# Patient Record
Sex: MALE | Race: WHITE | NOT HISPANIC OR LATINO | Employment: OTHER | URBAN - METROPOLITAN AREA
[De-identification: names, ages, dates, MRNs, and addresses within clinical notes are randomized per-mention and may not be internally consistent; named-entity substitution may affect disease eponyms.]

---

## 2022-12-23 ENCOUNTER — HOSPITAL ENCOUNTER (EMERGENCY)
Facility: MEDICAL CENTER | Age: 82
End: 2022-12-24
Attending: EMERGENCY MEDICINE
Payer: COMMERCIAL

## 2022-12-23 VITALS
DIASTOLIC BLOOD PRESSURE: 73 MMHG | OXYGEN SATURATION: 98 % | SYSTOLIC BLOOD PRESSURE: 131 MMHG | BODY MASS INDEX: 25.48 KG/M2 | HEART RATE: 84 BPM | WEIGHT: 172 LBS | RESPIRATION RATE: 22 BRPM | HEIGHT: 69 IN | TEMPERATURE: 97.5 F

## 2022-12-23 DIAGNOSIS — K59.09 CHRONIC CONSTIPATION: ICD-10-CM

## 2022-12-23 DIAGNOSIS — R33.8 ACUTE URINARY RETENTION: ICD-10-CM

## 2022-12-23 PROCEDURE — 51798 US URINE CAPACITY MEASURE: CPT

## 2022-12-23 PROCEDURE — 303105 HCHG CATHETER EXTRA

## 2022-12-23 PROCEDURE — 51702 INSERT TEMP BLADDER CATH: CPT

## 2022-12-23 PROCEDURE — 99284 EMERGENCY DEPT VISIT MOD MDM: CPT

## 2022-12-23 ASSESSMENT — PAIN DESCRIPTION - PAIN TYPE: TYPE: ACUTE PAIN

## 2022-12-24 NOTE — ED TRIAGE NOTES
"82 yr old male brought in by EMS.    Chief Complaint   Patient presents with    Urinary Retention    Unable to Urinate     Pt came in by EMS for bladder pain and bladder retention.  [facility], they took out yesterday at 1500. Pt has been unable to urinate since.     BP (!) 184/95   Pulse 82   Temp 36.1 °C (97 °F) (Temporal)   Resp (!) 22   Ht 1.753 m (5' 9\")   Wt 78 kg (172 lb)   SpO2 99%   BMI 25.40 kg/m²     "

## 2022-12-24 NOTE — ED PROVIDER NOTES
ED Provider Note     Scribed for Annette Cohen D.O. by Amie Adamson. 12/23/2022, 10:32 PM.     Primary care provider: No primary care provider noted.  Means of arrival: EMS         History obtained from: Patient  History limited by: None    CHIEF COMPLAINT  Chief Complaint   Patient presents with    Urinary Retention    Unable to Urinate     Pt came in by EMS for bladder pain and bladder retention.  Pt [facility], they took out yesterday at 1500. Pt has been unable to urinate since.       HPI  Jose Luis Tripp is a 82 y.o. male who presents to the emergency Department for evaluation of an inability to urine onset today around 3 pm. The patient states that he had a catheter [facility] about a week ago by the [facility] due to an inability to urinate, but that it was incorrectly placed and caused irritation to his penis. Thus, he states that the catheter was removed yesterday. He endorses associated symptoms of bladder pain, irritation and bleeding of the penis, and constipation. He notes that the pain was originally alleviated by the placement of a Villa catheter last week. The patient states that he is on Tamsulosin and Golytely. The patient denies history of prostate surgery. No major medical history noted.     REVIEW OF SYSTEMS  Pertinent positives include bladder pain, irritation and bleeding of the penis, and constipation.  See HPI for further details. All other systems are negative.    PAST MEDICAL HISTORY  Benign prostatic hypertrophy  Urinary retention    FAMILY HISTORY  History reviewed. No pertinent family history.    SOCIAL HISTORY  Social History     Tobacco Use    Smoking status: Never    Smokeless tobacco: Never   Substance Use Topics    Alcohol use: Not Currently    Drug use: Never      Social History     Substance and Sexual Activity   Drug Use Never       SURGICAL HISTORY  History reviewed. No pertinent surgical history.    CURRENT MEDICATIONS  No current facility-administered medications  "for this encounter.    ALLERGIES  Allergies   Allergen Reactions    Gabapentin      Severe nausea       PHYSICAL EXAM  VITAL SIGNS: BP (!) 184/95   Pulse 82   Temp 36.1 °C (97 °F) (Temporal)   Resp (!) 22   Ht 1.753 m (5' 9\")   Wt 78 kg (172 lb)   SpO2 99%   BMI 25.40 kg/m²     Constitutional: Extremely uncomfortable. Patient is well developed, well nourished.   HENT: Normocephalic,  Oropharynx moist without erythema or exudates.  Eyes: PERRL, EOMI  Neck: Supple   Lymphatic: No lymphadenopathy noted.   Cardiovascular: Normal heart rate and Regular rhythm. No murmur  Thorax & Lungs: Clear and equal breath sounds with good excursion. No respiratory distress, no rhonchi, wheezing or rales.   Abdomen: Palpably distended bladder. Lightly pink colored urine with small clots. Bowel sounds normal in all four quadrants. Soft, no rebound , guarding, palpable masses.   Skin: Warm, Dry  Extremities: Peripheral pulses 4/4 No edema, No tenderness  Musculoskeletal: Normal range of motion in all major joints.   Neurologic: Alert & oriented x 3, Normal motor function, Normal sensory function, No lateralizing or focal deficits noted. DTR's 4/4 bilaterally.  Psychiatric: Affect normal, Judgment normal, Mood normal.     COURSE & MEDICAL DECISION MAKING  Pertinent Labs & Imaging studies reviewed. (See chart for details)  Bladder scanner shows almost 900 cc of urine.    10:55 PM - Patient seen and evaluated at bedside. I ordered for a catheter to be placed to remove the urine from the patient's bladder.  Patient is draining pink urine with small clots present.  He feels remarkably better after the catheter was placed and has no suprapubic discomfort any longer.  Patient does complain of excoriation and pain around the distal penile urethra.  There is no active bleeding noted at this time.  I discussed the plan for care with the patient, including use of Neosporin at home to reduce penis irritation. I also discussed the importance " of eating a high fiber diet. Patient verbalizes understanding and agreement to this plan of care. Differential diagnoses include, but are not limited to: urinary retention, constipation.    The patient will return for new or worsening symptoms and is stable at the time of discharge.  [facility] for further evaluation of his urinary retention and enlarged prostate.  In the meantime he is to make sure he is drinking plenty of fluids.    DISPOSITION:  Patient will be discharged home in stable condition.    FOLLOW UP:   Department of [facility] ([facility] [place] [address]  [phone]  Schedule an appointment as soon as possible for a visit in 1 week      FINAL IMPRESSION  1. Acute urinary retention    2. Chronic constipation         Amie KHAN (Markusibe), am scribing for, and in the presence of, Annette Cohen D.O..    Electronically signed by: Amie Adamson (Markusibe), 12/23/2022    IAnnette D.O. personally performed the services described in this documentation, as scribed by Amie Adamson in my presence, and it is both accurate and complete.    The note accurately reflects work and decisions made by me.  Annette Cohen D.O.  12/24/2022  1:05 AM

## 2022-12-24 NOTE — DISCHARGE INSTRUCTIONS
Make sure that you are drinking plenty of fluids especially water and water based products  Follow a high fiber diet, look up foods high in fiber on the Internet and take according to your diet.  I recommend bran cereal, bran muffins daily or raw vegetables i.e. celery and broccoli.  Use the overnight bag as needed in the leg bag during the day.  Continue taking your Flomax and [facility] next Friday as scheduled.  In the interim should you experience any fever difficulty with your Villa catheter please return to our ER.  Take Neosporin and apply to your penile urethra at the area that is irritated for the next 3 days/ 3 times daily.

## 2022-12-24 NOTE — ED NOTES
Pt bladder scan showed 848cc.    MD order verified for placement of urinary catheter.     Catheter placed 700cc urine returned.

## 2022-12-24 NOTE — ED NOTES
Patient is stable for discharge at this time, anticipatory guidance provided, close follow-up is encouraged, and ED return instructions have been detailed. Patient agreeable to the disposition and plan and discharged home in ambulatory state and in good condition.

## 2023-01-31 ENCOUNTER — NON-PROVIDER VISIT (OUTPATIENT)
Dept: URGENT CARE | Facility: CLINIC | Age: 83
End: 2023-01-31

## 2023-01-31 DIAGNOSIS — Z11.1 VISIT FOR TB SKIN TEST: ICD-10-CM

## 2023-01-31 PROCEDURE — 99999 PR NO CHARGE: CPT | Performed by: NURSE PRACTITIONER

## 2023-01-31 PROCEDURE — 86580 TB INTRADERMAL TEST: CPT | Performed by: NURSE PRACTITIONER

## 2023-02-06 ENCOUNTER — APPOINTMENT (OUTPATIENT)
Dept: RADIOLOGY | Facility: IMAGING CENTER | Age: 83
End: 2023-02-06
Attending: FAMILY MEDICINE
Payer: COMMERCIAL

## 2023-02-06 ENCOUNTER — OFFICE VISIT (OUTPATIENT)
Dept: URGENT CARE | Facility: CLINIC | Age: 83
End: 2023-02-06
Payer: COMMERCIAL

## 2023-02-06 VITALS
DIASTOLIC BLOOD PRESSURE: 60 MMHG | HEIGHT: 69 IN | SYSTOLIC BLOOD PRESSURE: 110 MMHG | BODY MASS INDEX: 25.33 KG/M2 | TEMPERATURE: 97.5 F | WEIGHT: 171 LBS | RESPIRATION RATE: 18 BRPM | OXYGEN SATURATION: 98 % | HEART RATE: 80 BPM

## 2023-02-06 DIAGNOSIS — S46.219A BICEPS TENDON TEAR: ICD-10-CM

## 2023-02-06 PROCEDURE — 73060 X-RAY EXAM OF HUMERUS: CPT | Mod: TC,FY,LT | Performed by: RADIOLOGY

## 2023-02-06 PROCEDURE — 99203 OFFICE O/P NEW LOW 30 MIN: CPT | Performed by: FAMILY MEDICINE

## 2023-02-06 RX ORDER — ACETAMINOPHEN 500 MG
1000 TABLET ORAL EVERY 6 HOURS PRN
COMMUNITY

## 2023-02-06 ASSESSMENT — ENCOUNTER SYMPTOMS: MYALGIAS: 1

## 2023-02-06 NOTE — PROGRESS NOTES
"Subjective     Jose Luis Tripp is a 83 y.o. male who presents with Other (He was picking up something heavy Lt bicep, heard a \"pop\", bruised, painful to move x 5 days)      - This is a pleasant and nontoxic appearing 83 y.o. male who has come to the walk-in clinic today for:    #1) lifting wheelchair 5 days ago and felt a pop and pain. Has developed pain/bruising swelling since.       ALLERGIES:  Gabapentin     PMH:  History reviewed. No pertinent past medical history.     PSH:  History reviewed. No pertinent surgical history.    MEDS:    Current Outpatient Medications:     acetaminophen (TYLENOL) 500 MG Tab, Take 1,000 mg by mouth every 6 hours as needed., Disp: , Rfl:     LISINOPRIL PO, Take  by mouth., Disp: , Rfl:     Metoprolol-hydroCHLOROthiazide (METOPROLOL-HCTZ ER PO), Take  by mouth., Disp: , Rfl:     ATORVASTATIN CALCIUM PO, Take  by mouth., Disp: , Rfl:     FEBUXOSTAT PO, Take  by mouth., Disp: , Rfl:     FINASTERIDE PO, Take  by mouth., Disp: , Rfl:     ** I have documented what I find to be significant in regards to past medical, social, family and surgical history  in my HPI or under PMH/PSH/FH review section, otherwise it is noncontributory **         HPI    Review of Systems   Musculoskeletal:  Positive for myalgias.   All other systems reviewed and are negative.           Objective     /60 (BP Location: Right arm, Patient Position: Sitting, BP Cuff Size: Adult)   Pulse 80   Temp 36.4 °C (97.5 °F) (Temporal)   Resp 18   Ht 1.753 m (5' 9\")   Wt 77.6 kg (171 lb)   SpO2 98%   BMI 25.25 kg/m²      Physical Exam  Vitals and nursing note reviewed.   Constitutional:       General: He is not in acute distress.     Appearance: Normal appearance. He is well-developed.   HENT:      Head: Normocephalic.   Pulmonary:      Effort: Pulmonary effort is normal. No respiratory distress.   Musculoskeletal:        Arms:       Comments: Lt arm: + mild edema w/ bruising over anterior and medial asrm, some " bruising upper Lt chest.     Flex/abd 40 deg, limited rom elbow due to pain. NVI   Neurological:      Mental Status: He is alert.      Motor: No abnormal muscle tone.   Psychiatric:         Mood and Affect: Mood normal.         Behavior: Behavior normal.                           Assessment & Plan       1. Biceps tendon tear  DX-HUMERUS 2+ LEFT    Referral to Orthopedics        Suspected biceps tear, f/u [facility] today or tomorrow.     - Dx, plan & d/c instructions discussed   - Rest, stay hydrated  -  OTC Motrin and/or Tylenol as needed  - E.R. precautions discussed     Follow up with your regular primary care providers office for a recheck on today's visit. ER if not improving in 2-3 days or if feeling/getting worse.     Patient left in stable condition

## 2023-11-17 ENCOUNTER — APPOINTMENT (OUTPATIENT)
Dept: RADIOLOGY | Facility: MEDICAL CENTER | Age: 83
End: 2023-11-17
Attending: EMERGENCY MEDICINE
Payer: COMMERCIAL

## 2023-11-17 ENCOUNTER — HOSPITAL ENCOUNTER (EMERGENCY)
Facility: MEDICAL CENTER | Age: 83
End: 2023-11-17
Attending: EMERGENCY MEDICINE
Payer: COMMERCIAL

## 2023-11-17 VITALS
RESPIRATION RATE: 18 BRPM | SYSTOLIC BLOOD PRESSURE: 110 MMHG | HEIGHT: 69 IN | OXYGEN SATURATION: 95 % | BODY MASS INDEX: 26.38 KG/M2 | TEMPERATURE: 98.4 F | WEIGHT: 178.13 LBS | HEART RATE: 69 BPM | DIASTOLIC BLOOD PRESSURE: 61 MMHG

## 2023-11-17 DIAGNOSIS — S83.91XA SPRAIN OF RIGHT KNEE, UNSPECIFIED LIGAMENT, INITIAL ENCOUNTER: ICD-10-CM

## 2023-11-17 DIAGNOSIS — M25.461 EFFUSION OF RIGHT KNEE: ICD-10-CM

## 2023-11-17 PROCEDURE — 700102 HCHG RX REV CODE 250 W/ 637 OVERRIDE(OP): Performed by: EMERGENCY MEDICINE

## 2023-11-17 PROCEDURE — 302875 HCHG BANDAGE ACE 4 OR 6""

## 2023-11-17 PROCEDURE — A9270 NON-COVERED ITEM OR SERVICE: HCPCS | Performed by: EMERGENCY MEDICINE

## 2023-11-17 PROCEDURE — 99284 EMERGENCY DEPT VISIT MOD MDM: CPT

## 2023-11-17 PROCEDURE — 73562 X-RAY EXAM OF KNEE 3: CPT | Mod: RT

## 2023-11-17 RX ORDER — INDOMETHACIN 25 MG/1
25 CAPSULE ORAL 3 TIMES DAILY
Qty: 15 CAPSULE | Refills: 0 | Status: SHIPPED | OUTPATIENT
Start: 2023-11-17

## 2023-11-17 RX ORDER — INDOMETHACIN 25 MG/1
25 CAPSULE ORAL ONCE
Status: COMPLETED | OUTPATIENT
Start: 2023-11-17 | End: 2023-11-17

## 2023-11-17 RX ADMIN — INDOMETHACIN 25 MG: 25 CAPSULE ORAL at 17:10

## 2023-11-17 NOTE — ED TRIAGE NOTES
Chief Complaint   Patient presents with    Knee Pain     R knee swelling with hx of gout. Denies injury, fevers/chills.

## 2023-11-18 NOTE — ED PROVIDER NOTES
ED Provider Note    CHIEF COMPLAINT  Chief Complaint   Patient presents with    Knee Pain     R knee swelling with hx of gout. Denies injury, fevers/chills.       EXTERNAL RECORDS REVIEWED  Outpatient Notes reviewed office visit progress note by Dr. Maloney dated February 6, 2023.  Patient seen for evaluation of suspected bicep tear.  X-ray of humerus ordered.  Reviewed unremarkable x-ray also dated February 6.    HPI/ROS  LIMITATION TO HISTORY   Select: : None  OUTSIDE HISTORIAN(S):  Caregiver at bedside notes pain to knee for 2 weeks    Jose Luis Tripp is a 83 y.o. male who presents for evaluation of acute pain to right knee.  Patient relates he initially had discomfort to the top of his patella about 2 weeks ago when he was riding a stationary bike.  He notes he has since developed warmth and swelling to the right knee which she notes was identical to previous gout attacks.  He took colchicine and has been applying ice and notes significant improvement in the swelling.  He relates he is still having pain when he walks in particular when he bends his knee when sitting.  No left-sided symptoms.  No fever.  No repeat trauma.  Swelling improved but he notes visiting nurse was concerned for potential joint effusion noting swelling to the inside of his knee yesterday and as such she came to be assessed.    PAST MEDICAL HISTORY   has a past medical history of A-fib (HCC), Diabetes (HCC), Enlarged prostate, Glaucoma, Gout, and Hypertension.    SURGICAL HISTORY  patient denies any surgical history    FAMILY HISTORY  Noncontributory    SOCIAL HISTORY  Social History     Tobacco Use    Smoking status: Never    Smokeless tobacco: Never   Vaping Use    Vaping Use: Never used   Substance and Sexual Activity    Alcohol use: Not Currently    Drug use: Never    Sexual activity: Not on file       CURRENT MEDICATIONS  Home Medications       Reviewed by Radha Bob R.N. (Registered Nurse) on 11/17/23 at 1502  Med List Status:  "Not Addressed     Medication Last Dose Status   acetaminophen (TYLENOL) 500 MG Tab  Active   ATORVASTATIN CALCIUM PO  Active   FEBUXOSTAT PO  Active   FINASTERIDE PO  Active   LISINOPRIL PO  Active   Metoprolol-hydroCHLOROthiazide (METOPROLOL-HCTZ ER PO)  Active                    ALLERGIES  Allergies   Allergen Reactions    Gabapentin      Severe nausea       PHYSICAL EXAM  VITAL SIGNS: /61   Pulse 69   Temp 36.9 °C (98.4 °F) (Temporal)   Resp 18   Ht 1.753 m (5' 9\")   Wt 80.8 kg (178 lb 2.1 oz)   SpO2 95%   BMI 26.31 kg/m²    General: Alert, no acute distress  Skin: Warm, dry, normal for ethnicity  Head: Normocephalic, atraumatic  Neck: Trachea midline  Eye: PERRL, normal conjunctiva  ENMT: Oral mucosa moist  Cardiovascular: Regular rate and rhythm, Normal peripheral perfusion  Respiratory: respirations are non-labored  Musculoskeletal: Mild swelling more so to the medial compartment to the anterior right knee.  There is mild tenderness to the proximal insertion of the patella on the right.  Joint is not frankly warm nor erythematous.  Negative Lachman sign, negative anterior posterior drawer sign, no laxity with valgus or varus stress.  Painful range of motion with regard to flexion primarily.  No palpable crepitus.  No deformity.  Neurological: Alert and oriented to person, place, time, and situation.  Flexion and extension of knees grossly intact, sensation to pain light touch is grossly intact to the right lower extremity.  Lymphatics:  no right lower extreme lymphangitis  Psychiatric: Cooperative, appropriate mood & affect       RADIOLOGY  I have independently interpreted the diagnostic imaging associated with this visit and am waiting the final reading from the radiologist.   My preliminary interpretation is as follows: Subtle soft tissue swelling consistent with mild knee effusion, no fracture, degenerative changes noted  Radiologist interpretation:   DX-KNEE 3 VIEWS RIGHT   Final Result    " "  Osteoarthritic degenerative changes of the knee without acute fracture or malalignment.           COURSE & MEDICAL DECISION MAKING    ED Observation Status? Yes; I am placing the patient in to an observation status due to a diagnostic uncertainty as well as therapeutic intensity. Patient placed in observation status at 4:58 PM, 11/17/2023.     Observation plan is as follows: Patient medicated with indomethacin 25 mg p.o., also placed 2 blankets folded up underneath his knee to hold in slight flexion, he is already feeling better with that.  X-ray is indicated and will be obtained.  Differential diagnosis at this time includes but is not restricted to knee effusion, gout, sprain, fracture    1759: Patient reassessed, in no acute distress.  Have ordered Ace wrap for his left knee.  I have updated him with reassuring x-ray.    Patient Vitals for the past 24 hrs:   BP Temp Temp src Pulse Resp SpO2 Height Weight   11/17/23 1820 110/61 36.9 °C (98.4 °F) Temporal 69 18 95 % -- --   11/17/23 1459 130/80 (!) 35.7 °C (96.3 °F) Temporal 60 20 97 % 1.753 m (5' 9\") 80.8 kg (178 lb 2.1 oz)        Upon Reevaluation, the patient's condition has: Improved; and will be discharged.    Patient discharged from ED Observation status at 1759 (Time) 11/17/23 (Date).     INITIAL ASSESSMENT, COURSE AND PLAN  Care Narrative: This patient is a very pleasant well in appearance 33-year-old with history of gout who presents for evaluation of acute pain to the right knee after he injured it when he was riding a stationary bike.  Describes what sounds like gout flare that is since improved and thus left in painful range of motion.  Given age certainly there is indication for x-ray imaging for concern of occult fracture.  Thankfully no evidence of fracture on imaging.  I suspect likely subtle injury to the patella tendon and he now has a mild effusion more so to the medial compartment of the knee.  I have applied an Ace wrap, he is doing well with " NSAIDs in the past and I will thusly write him for indomethacin, dose attenuated for age.  He is amenable to outpatient follow-up.  HTN/IDDM FOLLOW UP:  The patient has known hypertension and is being followed by their primary care doctor      ADDITIONAL PROBLEM LIST  Right knee effusion, right knee arthritis  DISPOSITION AND DISCUSSIONS  I have discussed management of the patient with the following physicians and ISA's:  NA    Discussion of management with other QHP or appropriate source(s): None     Escalation of care considered, and ultimately not performed:NA    Barriers to care at this time, including but not limited to:  NA .     Decision tools and prescription drugs considered including, but not limited to:  NA .    The patient will return for new or worsening symptoms and is stable at the time of discharge.    Patient has had high blood pressure while in the emergency department, felt likely secondary to medical condition. Counseled patient to monitor blood pressure at home and follow up with primary care physician.      DISPOSITION:  Patient will be discharged home in stable condition.    FOLLOW UP:  Keshawn De La Torre M.D.  [address] [facility] [address]  [phone]    Schedule an appointment as soon as possible for a visit         OUTPATIENT MEDICATIONS:  Discharge Medication List as of 11/17/2023  6:22 PM        START taking these medications    Details   indomethacin (INDOCIN) 25 MG Cap Take 1 Capsule by mouth 3 times a day., Disp-15 Capsule, R-0, Normal                FINAL DIAGNOSIS  1. Sprain of right knee, unspecified ligament, initial encounter    2. Effusion of right knee           Electronically signed by: Julian Van M.D., 11/17/2023 4:46 PM